# Patient Record
Sex: FEMALE | NOT HISPANIC OR LATINO | Employment: OTHER | URBAN - METROPOLITAN AREA
[De-identification: names, ages, dates, MRNs, and addresses within clinical notes are randomized per-mention and may not be internally consistent; named-entity substitution may affect disease eponyms.]

---

## 2020-02-28 ENCOUNTER — OFFICE VISIT (OUTPATIENT)
Dept: CARDIOLOGY | Facility: CLINIC | Age: 25
End: 2020-02-28

## 2020-02-28 VITALS
BODY MASS INDEX: 40 KG/M2 | HEART RATE: 121 BPM | OXYGEN SATURATION: 94 % | SYSTOLIC BLOOD PRESSURE: 150 MMHG | HEIGHT: 57 IN | WEIGHT: 185.4 LBS | DIASTOLIC BLOOD PRESSURE: 100 MMHG

## 2020-02-28 DIAGNOSIS — I10 ESSENTIAL HYPERTENSION: Primary | ICD-10-CM

## 2020-02-28 PROCEDURE — 99244 OFF/OP CNSLTJ NEW/EST MOD 40: CPT | Performed by: INTERNAL MEDICINE

## 2020-02-28 RX ORDER — FLUTICASONE PROPIONATE 50 MCG
1 SPRAY, SUSPENSION (ML) NASAL DAILY
COMMUNITY
Start: 2016-04-14

## 2020-02-28 RX ORDER — LISINOPRIL AND HYDROCHLOROTHIAZIDE 12.5; 1 MG/1; MG/1
1 TABLET ORAL DAILY
Qty: 30 TABLET | Refills: 11 | Status: SHIPPED | OUTPATIENT
Start: 2020-02-28 | End: 2020-08-14 | Stop reason: RX

## 2020-02-28 RX ORDER — FLUTICASONE PROPIONATE 220 UG/1
AEROSOL, METERED RESPIRATORY (INHALATION)
COMMUNITY
Start: 2020-02-03

## 2020-02-28 RX ORDER — ACETAMINOPHEN 160 MG/5ML
SOLUTION ORAL
COMMUNITY

## 2020-02-28 RX ORDER — LEVETIRACETAM 750 MG/1
TABLET ORAL
COMMUNITY
Start: 2020-02-23

## 2020-02-28 RX ORDER — FLUTICASONE FUROATE 100 UG/1
POWDER RESPIRATORY (INHALATION)
COMMUNITY
Start: 2020-02-04 | End: 2022-03-28 | Stop reason: ALTCHOICE

## 2020-02-28 RX ORDER — ACETAMINOPHEN 160 MG/5ML
LIQUID ORAL
COMMUNITY
Start: 2019-12-14 | End: 2022-01-24 | Stop reason: ALTCHOICE

## 2020-02-28 RX ORDER — DIPHENOXYLATE HYDROCHLORIDE AND ATROPINE SULFATE 2.5; .025 MG/1; MG/1
TABLET ORAL DAILY
COMMUNITY
Start: 2020-01-25

## 2020-02-28 RX ORDER — ALBUTEROL SULFATE 2.5 MG/3ML
2.5 SOLUTION RESPIRATORY (INHALATION)
COMMUNITY

## 2020-03-05 PROBLEM — I10 ESSENTIAL HYPERTENSION: Status: ACTIVE | Noted: 2020-03-05

## 2020-03-05 NOTE — PROGRESS NOTES
Subjective:     Encounter Date:02/28/2020      Patient ID: Shari Connelly is a 24 y.o. female.    Dear Jess thank you for referring this patient for consultation of hypertension.          Chief Complaint:  Hypertension   This is a recurrent problem. The current episode started more than 1 month ago. The problem has been waxing and waning since onset. The problem is uncontrolled.       24-year-old female who presents today for evaluation.  Patient says back in September having some issues.  In November she developed pneumonia.  1/5/2020 she had a seizure.  She said it started in her hand and then went over her entire body.  She was seen and evaluated at that time and her blood pressure was noted to be elevated.  On 1/12/2020 she had a second seizure on 2/12/2020 she had a third seizure and on 2/23/2020 when she had a fourth seizure.  A lot of times appears to be associated with her blood pressure.  Patient has been evaluated by a neurologist and was seen in my office today for further treatment of her blood pressure.  Review of Systems   Respiratory: Positive for cough and wheezing.    Skin: Positive for itching.   All other systems reviewed and are negative.      Procedures       Objective:     Physical Exam   Constitutional: She is oriented to person, place, and time. She appears well-developed.   HENT:   Head: Normocephalic.   Eyes: Conjunctivae are normal.   Neck: Normal range of motion.   Cardiovascular: Normal rate, regular rhythm and normal heart sounds.   Pulmonary/Chest: Breath sounds normal.   Abdominal: Soft. Bowel sounds are normal.   Musculoskeletal: Normal range of motion. She exhibits no edema.   Neurological: She is alert and oriented to person, place, and time.   Skin: Skin is warm and dry.   Psychiatric: She has a normal mood and affect. Her behavior is normal.   Vitals reviewed.      Lab Review:       Assessment:          Diagnosis Plan   1. Essential hypertension  Basic Metabolic Panel     Duplex Renal Artery - Bilateral Complete CAR          Plan:       1.  Hypertension the setting of a young female I am going to set her up for a renal ultrasound.  Patient was started on Zestoretic.  She has not had any blood work since she started on it some also setting her up for a BMP.  Will await the studies follow her blood pressure and see what evolves.  Reevaluate in 4 weeks.  2.  Seizure-like activity.  I also encouraged her to follow-up with neurology.

## 2020-03-23 ENCOUNTER — TELEPHONE (OUTPATIENT)
Dept: CARDIOLOGY | Facility: CLINIC | Age: 25
End: 2020-03-23

## 2020-03-23 NOTE — TELEPHONE ENCOUNTER
03/23/20  12:26 PM    Patient called for COVID rescreening.    Patient would  Like to keep appointment. Patient is negative for COVID screening. .      RIAZ Bishop  [facility]

## 2020-03-24 ENCOUNTER — OFFICE VISIT (OUTPATIENT)
Dept: CARDIOLOGY | Facility: CLINIC | Age: 25
End: 2020-03-24

## 2020-03-24 VITALS
WEIGHT: 184 LBS | DIASTOLIC BLOOD PRESSURE: 80 MMHG | BODY MASS INDEX: 39.7 KG/M2 | HEIGHT: 57 IN | OXYGEN SATURATION: 100 % | HEART RATE: 89 BPM | SYSTOLIC BLOOD PRESSURE: 120 MMHG

## 2020-03-24 DIAGNOSIS — I10 ESSENTIAL HYPERTENSION: Primary | ICD-10-CM

## 2020-03-24 PROCEDURE — 99213 OFFICE O/P EST LOW 20 MIN: CPT | Performed by: INTERNAL MEDICINE

## 2020-03-24 RX ORDER — AMOXICILLIN 400 MG/5ML
POWDER, FOR SUSPENSION ORAL
COMMUNITY
Start: 2020-03-12 | End: 2020-12-02 | Stop reason: ALTCHOICE

## 2020-03-24 RX ORDER — BENZONATATE 100 MG/1
CAPSULE ORAL
COMMUNITY
Start: 2020-03-07 | End: 2020-12-02 | Stop reason: ALTCHOICE

## 2020-03-24 RX ORDER — AMOXICILLIN 875 MG/1
TABLET, COATED ORAL
COMMUNITY
Start: 2020-03-12 | End: 2020-12-02 | Stop reason: ALTCHOICE

## 2020-03-24 NOTE — PROGRESS NOTES
Subjective:     Encounter Date:03/24/2020      Patient ID: Shari Connelly is a 24 y.o. female.    Chief Complaint:  Hypertension   This is a chronic problem. The problem is controlled.     24-year-old female who presents today for reevaluation.  Patient had a blood pressure that was out-of-control.  We changed her medicines around and she presents today for reevaluation.  She did not get her echo or renal ultrasound because of the current outbreak.  She did keep her appointment today and says overall she is doing significantly better.  Her blood pressure today is 120/80 these are the numbers she has been getting at home.  Her only complaint is a headache which is been going on for a long time she has seen neurology for.  Some of that may be secondary to her blood pressure dropping she denies any types of visual changes chest pain shortness of breath dizziness lightheadedness.    Review of Systems   All other systems reviewed and are negative.      Procedures       Objective:     Physical Exam   Constitutional: She is oriented to person, place, and time. She appears well-developed.   HENT:   Head: Normocephalic.   Eyes: Conjunctivae are normal.   Neck: Normal range of motion.   Cardiovascular: Normal rate, regular rhythm and normal heart sounds.   Pulmonary/Chest: Breath sounds normal.   Abdominal: Soft. Bowel sounds are normal.   Musculoskeletal: Normal range of motion. She exhibits no edema.   Neurological: She is alert and oriented to person, place, and time.   Skin: Skin is warm and dry.   Psychiatric: She has a normal mood and affect. Her behavior is normal.   Vitals reviewed.      Lab Review:       Assessment:          Diagnosis Plan   1. Essential hypertension            Plan:       1.  Hypertension blood pressure significantly better clinically doing well.  She has a headache which has been chronic in nature she is seen neurology for this.  I told her to get her echo and renal ultrasound about 3 months from  now due to the current viral outbreak.  I told her follow-up in 6 months sooner if she has issues.

## 2020-08-14 ENCOUNTER — TELEPHONE (OUTPATIENT)
Dept: CARDIOLOGY | Facility: CLINIC | Age: 25
End: 2020-08-14

## 2020-08-14 RX ORDER — LISINOPRIL 10 MG/1
10 TABLET ORAL DAILY
Qty: 30 TABLET | Refills: 6 | Status: SHIPPED | OUTPATIENT
Start: 2020-08-14 | End: 2021-02-23

## 2020-08-14 RX ORDER — HYDROCHLOROTHIAZIDE 12.5 MG/1
12.5 CAPSULE, GELATIN COATED ORAL DAILY
COMMUNITY
End: 2020-08-14 | Stop reason: SDUPTHER

## 2020-08-14 RX ORDER — HYDROCHLOROTHIAZIDE 12.5 MG/1
12.5 CAPSULE, GELATIN COATED ORAL DAILY
Qty: 30 CAPSULE | Refills: 6 | Status: SHIPPED | OUTPATIENT
Start: 2020-08-14 | End: 2021-02-23

## 2020-08-14 RX ORDER — LISINOPRIL 10 MG/1
10 TABLET ORAL DAILY
COMMUNITY
End: 2020-08-14 | Stop reason: SDUPTHER

## 2020-08-14 NOTE — TELEPHONE ENCOUNTER
Pt's mother left [facility] and is upset because the  of her daughter's Lisinopril-hctz changes every time she picks it up-- the pill is a different color so it's upsetting her b/c she feels like the medicine is being changed,etc.  She said Shari's BP was doing great.    I called & spoke with the pharmacist to see what's going on -- he said there's a global backorder of this medicine since last year.  He said you could order this as 2 separate prescriptions for a constant  <---He said it's been constant for @ 6 months.    Thanks,  Elin

## 2020-10-07 ENCOUNTER — TELEPHONE (OUTPATIENT)
Dept: CARDIOLOGY | Facility: CLINIC | Age: 25
End: 2020-10-07

## 2020-10-07 NOTE — TELEPHONE ENCOUNTER
Pt called regarding echo and labs that she needs to get done and also needs a follow up appointment.     Attempted to contact patient with no answer. Left message to call back.     Scheduling please attempt to schedule for Echo and follow up appointment per visit on 3/24/2020.

## 2020-10-08 DIAGNOSIS — I10 ESSENTIAL HYPERTENSION: Primary | ICD-10-CM

## 2020-10-08 NOTE — TELEPHONE ENCOUNTER
Patient was scheduled for her next follow up at her last appointment.    I do not see an order for an echo, but I do see one for a renal ultrasound.  Please advise on what testing I am supposed to schedule.    Thank you  Casandra MAYS

## 2020-10-08 NOTE — TELEPHONE ENCOUNTER
LOV you stated pt needs Echo & renal US.  Please place order for Echo.  Also, pt's f/u appt is in December-do you want to see her sooner?    Thanks,  Elin

## 2020-10-13 ENCOUNTER — TELEPHONE (OUTPATIENT)
Dept: CARDIOLOGY | Facility: CLINIC | Age: 25
End: 2020-10-13

## 2020-10-13 DIAGNOSIS — I10 ESSENTIAL HYPERTENSION: Primary | ICD-10-CM

## 2020-10-28 ENCOUNTER — LAB (OUTPATIENT)
Dept: LAB | Facility: HOSPITAL | Age: 25
End: 2020-10-28

## 2020-10-28 ENCOUNTER — HOSPITAL ENCOUNTER (OUTPATIENT)
Dept: CARDIOLOGY | Facility: HOSPITAL | Age: 25
Discharge: HOME | End: 2020-10-28

## 2020-10-28 VITALS
BODY MASS INDEX: 39.7 KG/M2 | SYSTOLIC BLOOD PRESSURE: 120 MMHG | DIASTOLIC BLOOD PRESSURE: 74 MMHG | HEIGHT: 57 IN | WEIGHT: 184 LBS

## 2020-10-28 DIAGNOSIS — I10 ESSENTIAL HYPERTENSION: ICD-10-CM

## 2020-10-28 LAB
ANION GAP SERPL CALCULATED.3IONS-SCNC: 9.6 MMOL/L (ref 5–15)
BASOPHILS # BLD AUTO: 0.07 10*3/MM3 (ref 0–0.2)
BASOPHILS NFR BLD AUTO: 0.8 % (ref 0–1.5)
BH CV ECHO MEAS - DIST REN A EDV LEFT: 26.1 CM/SEC
BH CV ECHO MEAS - DIST REN A PSV LEFT: 190 CM/SEC
BH CV ECHO MEAS - DIST REN A RI LEFT: 0.86
BH CV ECHO MEAS - MID REN A EDV LEFT: -52.7 CM/SEC
BH CV ECHO MEAS - MID REN A PSV LEFT: -160.3 CM/SEC
BH CV ECHO MEAS - MID REN A RI LEFT: 0.67
BH CV ECHO MEAS - PROX REN A EDV LEFT: -28.6 CM/SEC
BH CV ECHO MEAS - PROX REN A PSV LEFT: -123 CM/SEC
BH CV ECHO MEAS - PROX REN A RI LEFT: 0.77
BH CV VAS KIDNEY HEIGHT LEFT: 4.5 CM
BH CV VAS RENAL AORTIC MID PSV: 161 CM/S
BH CV XLRA MEAS - KID L LEFT: 9.7 CM
BH CV XLRA MEAS DIST REN A EDV RIGHT: -26.5 CM/SEC
BH CV XLRA MEAS DIST REN A PSV RIGHT: -103.4 CM/SEC
BH CV XLRA MEAS DIST REN A RI RIGHT: 0.74
BH CV XLRA MEAS KID H RIGHT: 4.8 CM
BH CV XLRA MEAS KID L RIGHT: 11.4 CM
BH CV XLRA MEAS MID REN A EDV RIGHT: -39.5 CM/SEC
BH CV XLRA MEAS MID REN A PSV RIGHT: -145 CM/SEC
BH CV XLRA MEAS MID REN A RI RIGHT: 0.73
BH CV XLRA MEAS PROX REN A EDV RIGHT: 42.8 CM/SEC
BH CV XLRA MEAS PROX REN A PSV RIGHT: 163.6 CM/SEC
BH CV XLRA MEAS PROX REN A RI RIGHT: 0.74
BH CV XLRA MEAS RAR LEFT: 0.8
BH CV XLRA MEAS RAR RIGHT: 0.9
BUN SERPL-MCNC: 6 MG/DL (ref 6–20)
BUN/CREAT SERPL: 12.5 (ref 7–25)
CALCIUM SPEC-SCNC: 9.4 MG/DL (ref 8.6–10.5)
CHLORIDE SERPL-SCNC: 101 MMOL/L (ref 98–107)
CO2 SERPL-SCNC: 25.4 MMOL/L (ref 22–29)
CREAT SERPL-MCNC: 0.48 MG/DL (ref 0.57–1)
DEPRECATED RDW RBC AUTO: 38.2 FL (ref 37–54)
EOSINOPHIL # BLD AUTO: 0.19 10*3/MM3 (ref 0–0.4)
EOSINOPHIL NFR BLD AUTO: 2 % (ref 0.3–6.2)
ERYTHROCYTE [DISTWIDTH] IN BLOOD BY AUTOMATED COUNT: 13 % (ref 12.3–15.4)
GFR SERPL CREATININE-BSD FRML MDRD: >150 ML/MIN/1.73
GFR SERPL CREATININE-BSD FRML MDRD: >150 ML/MIN/1.73
GLUCOSE SERPL-MCNC: 89 MG/DL (ref 65–99)
HCT VFR BLD AUTO: 41.9 % (ref 34–46.6)
HGB BLD-MCNC: 14.4 G/DL (ref 12–15.9)
IMM GRANULOCYTES # BLD AUTO: 0.04 10*3/MM3 (ref 0–0.05)
IMM GRANULOCYTES NFR BLD AUTO: 0.4 % (ref 0–0.5)
LYMPHOCYTES # BLD AUTO: 1.82 10*3/MM3 (ref 0.7–3.1)
LYMPHOCYTES NFR BLD AUTO: 19.5 % (ref 19.6–45.3)
MCH RBC QN AUTO: 28 PG (ref 26.6–33)
MCHC RBC AUTO-ENTMCNC: 34.4 G/DL (ref 31.5–35.7)
MCV RBC AUTO: 81.4 FL (ref 79–97)
MONOCYTES # BLD AUTO: 0.64 10*3/MM3 (ref 0.1–0.9)
MONOCYTES NFR BLD AUTO: 6.9 % (ref 5–12)
NEUTROPHILS NFR BLD AUTO: 6.56 10*3/MM3 (ref 1.7–7)
NEUTROPHILS NFR BLD AUTO: 70.4 % (ref 42.7–76)
NRBC BLD AUTO-RTO: 0 /100 WBC (ref 0–0.2)
PLATELET # BLD AUTO: 398 10*3/MM3 (ref 140–450)
PMV BLD AUTO: 9.7 FL (ref 6–12)
POTASSIUM SERPL-SCNC: 3.8 MMOL/L (ref 3.5–5.2)
RBC # BLD AUTO: 5.15 10*6/MM3 (ref 3.77–5.28)
SODIUM SERPL-SCNC: 136 MMOL/L (ref 136–145)
TSH SERPL DL<=0.05 MIU/L-ACNC: 1.48 UIU/ML (ref 0.27–4.2)
WBC # BLD AUTO: 9.32 10*3/MM3 (ref 3.4–10.8)

## 2020-10-28 PROCEDURE — 93975 VASCULAR STUDY: CPT

## 2020-10-28 PROCEDURE — 93306 TTE W/DOPPLER COMPLETE: CPT | Performed by: INTERNAL MEDICINE

## 2020-10-28 PROCEDURE — 36415 COLL VENOUS BLD VENIPUNCTURE: CPT

## 2020-10-28 PROCEDURE — 93306 TTE W/DOPPLER COMPLETE: CPT

## 2020-10-28 PROCEDURE — 84443 ASSAY THYROID STIM HORMONE: CPT

## 2020-10-28 PROCEDURE — 80048 BASIC METABOLIC PNL TOTAL CA: CPT

## 2020-10-28 PROCEDURE — 93975 VASCULAR STUDY: CPT | Performed by: INTERNAL MEDICINE

## 2020-10-28 PROCEDURE — 85025 COMPLETE CBC W/AUTO DIFF WBC: CPT

## 2020-10-29 ENCOUNTER — TELEPHONE (OUTPATIENT)
Dept: CARDIOLOGY | Facility: CLINIC | Age: 25
End: 2020-10-29

## 2020-10-29 LAB
BH CV ECHO MEAS - ACS: 1.6 CM
BH CV ECHO MEAS - AO MAX PG (FULL): 7.1 MMHG
BH CV ECHO MEAS - AO MAX PG: 12.3 MMHG
BH CV ECHO MEAS - AO MEAN PG (FULL): 4.3 MMHG
BH CV ECHO MEAS - AO MEAN PG: 7.6 MMHG
BH CV ECHO MEAS - AO ROOT AREA (BSA CORRECTED): 1.3
BH CV ECHO MEAS - AO ROOT AREA: 4.2 CM^2
BH CV ECHO MEAS - AO ROOT DIAM: 2.3 CM
BH CV ECHO MEAS - AO V2 MAX: 175.1 CM/SEC
BH CV ECHO MEAS - AO V2 MEAN: 132.3 CM/SEC
BH CV ECHO MEAS - AO V2 VTI: 37 CM
BH CV ECHO MEAS - AVA(I,A): 1.2 CM^2
BH CV ECHO MEAS - AVA(I,D): 1.2 CM^2
BH CV ECHO MEAS - AVA(V,A): 1.3 CM^2
BH CV ECHO MEAS - AVA(V,D): 1.3 CM^2
BH CV ECHO MEAS - BSA(HAYCOCK): 1.9 M^2
BH CV ECHO MEAS - BSA: 1.7 M^2
BH CV ECHO MEAS - BZI_BMI: 39.8 KILOGRAMS/M^2
BH CV ECHO MEAS - BZI_METRIC_HEIGHT: 144.8 CM
BH CV ECHO MEAS - BZI_METRIC_WEIGHT: 83.5 KG
BH CV ECHO MEAS - EDV(MOD-SP2): 83 ML
BH CV ECHO MEAS - EDV(MOD-SP4): 102 ML
BH CV ECHO MEAS - EDV(TEICH): 79.6 ML
BH CV ECHO MEAS - EF(CUBED): 69.2 %
BH CV ECHO MEAS - EF(MOD-BP): 62.3 %
BH CV ECHO MEAS - EF(MOD-SP2): 60.2 %
BH CV ECHO MEAS - EF(MOD-SP4): 62.7 %
BH CV ECHO MEAS - EF(TEICH): 61.1 %
BH CV ECHO MEAS - ESV(MOD-SP2): 33 ML
BH CV ECHO MEAS - ESV(MOD-SP4): 38 ML
BH CV ECHO MEAS - ESV(TEICH): 30.9 ML
BH CV ECHO MEAS - FS: 32.5 %
BH CV ECHO MEAS - IVS/LVPW: 0.92
BH CV ECHO MEAS - IVSD: 0.73 CM
BH CV ECHO MEAS - LAT PEAK E' VEL: 12.2 CM/SEC
BH CV ECHO MEAS - LV DIASTOLIC VOL/BSA (35-75): 58.8 ML/M^2
BH CV ECHO MEAS - LV MASS(C)D: 95.9 GRAMS
BH CV ECHO MEAS - LV MASS(C)DI: 55.3 GRAMS/M^2
BH CV ECHO MEAS - LV MAX PG: 5.2 MMHG
BH CV ECHO MEAS - LV MEAN PG: 3.4 MMHG
BH CV ECHO MEAS - LV SYSTOLIC VOL/BSA (12-30): 21.9 ML/M^2
BH CV ECHO MEAS - LV V1 MAX: 113.8 CM/SEC
BH CV ECHO MEAS - LV V1 MEAN: 88.2 CM/SEC
BH CV ECHO MEAS - LV V1 VTI: 23 CM
BH CV ECHO MEAS - LVIDD: 4.2 CM
BH CV ECHO MEAS - LVIDS: 2.9 CM
BH CV ECHO MEAS - LVLD AP2: 7.1 CM
BH CV ECHO MEAS - LVLD AP4: 7.6 CM
BH CV ECHO MEAS - LVLS AP2: 6.1 CM
BH CV ECHO MEAS - LVLS AP4: 5.9 CM
BH CV ECHO MEAS - LVOT AREA (M): 2 CM^2
BH CV ECHO MEAS - LVOT AREA: 2 CM^2
BH CV ECHO MEAS - LVOT DIAM: 1.6 CM
BH CV ECHO MEAS - LVPWD: 0.8 CM
BH CV ECHO MEAS - MED PEAK E' VEL: 9.9 CM/SEC
BH CV ECHO MEAS - MV A DUR: 0.08 SEC
BH CV ECHO MEAS - MV A MAX VEL: 75.5 CM/SEC
BH CV ECHO MEAS - MV DEC SLOPE: 500.1 CM/SEC^2
BH CV ECHO MEAS - MV DEC TIME: 0.12 SEC
BH CV ECHO MEAS - MV E MAX VEL: 103 CM/SEC
BH CV ECHO MEAS - MV E/A: 1.4
BH CV ECHO MEAS - MV MAX PG: 5.6 MMHG
BH CV ECHO MEAS - MV MEAN PG: 3.1 MMHG
BH CV ECHO MEAS - MV P1/2T MAX VEL: 103.2 CM/SEC
BH CV ECHO MEAS - MV P1/2T: 60.4 MSEC
BH CV ECHO MEAS - MV V2 MAX: 118.1 CM/SEC
BH CV ECHO MEAS - MV V2 MEAN: 84.2 CM/SEC
BH CV ECHO MEAS - MV V2 VTI: 25.1 CM
BH CV ECHO MEAS - MVA P1/2T LCG: 2.1 CM^2
BH CV ECHO MEAS - MVA(P1/2T): 3.6 CM^2
BH CV ECHO MEAS - MVA(VTI): 1.8 CM^2
BH CV ECHO MEAS - PA MAX PG (FULL): 5.4 MMHG
BH CV ECHO MEAS - PA MAX PG: 7.2 MMHG
BH CV ECHO MEAS - PA V2 MAX: 134.4 CM/SEC
BH CV ECHO MEAS - PULM A REVS DUR: 0.08 SEC
BH CV ECHO MEAS - PULM A REVS VEL: 28.9 CM/SEC
BH CV ECHO MEAS - PULM DIAS VEL: 42 CM/SEC
BH CV ECHO MEAS - PULM S/D: 1.4
BH CV ECHO MEAS - PULM SYS VEL: 57.7 CM/SEC
BH CV ECHO MEAS - PVA(V,A): 1.4 CM^2
BH CV ECHO MEAS - PVA(V,D): 1.4 CM^2
BH CV ECHO MEAS - QP/QS: 1.2
BH CV ECHO MEAS - RAP SYSTOLE: 3 MMHG
BH CV ECHO MEAS - RV MAX PG: 1.8 MMHG
BH CV ECHO MEAS - RV MEAN PG: 1.3 MMHG
BH CV ECHO MEAS - RV V1 MAX: 67.3 CM/SEC
BH CV ECHO MEAS - RV V1 MEAN: 54.9 CM/SEC
BH CV ECHO MEAS - RV V1 VTI: 19 CM
BH CV ECHO MEAS - RVOT AREA: 2.8 CM^2
BH CV ECHO MEAS - RVOT DIAM: 1.9 CM
BH CV ECHO MEAS - SI(AO): 89.3 ML/M^2
BH CV ECHO MEAS - SI(CUBED): 30 ML/M^2
BH CV ECHO MEAS - SI(LVOT): 26.1 ML/M^2
BH CV ECHO MEAS - SI(MOD-SP2): 28.8 ML/M^2
BH CV ECHO MEAS - SI(MOD-SP4): 36.9 ML/M^2
BH CV ECHO MEAS - SI(TEICH): 28 ML/M^2
BH CV ECHO MEAS - SV(AO): 155.1 ML
BH CV ECHO MEAS - SV(CUBED): 52.1 ML
BH CV ECHO MEAS - SV(LVOT): 45.2 ML
BH CV ECHO MEAS - SV(MOD-SP2): 50 ML
BH CV ECHO MEAS - SV(MOD-SP4): 64 ML
BH CV ECHO MEAS - SV(RVOT): 52.9 ML
BH CV ECHO MEAS - SV(TEICH): 48.6 ML
BH CV ECHO MEAS - TAPSE (>1.6): 2.4 CM
BH CV ECHO MEASUREMENTS AVERAGE E/E' RATIO: 9.32
BH CV XLRA - RV BASE: 3.2 CM
BH CV XLRA - RV LENGTH: 6.4 CM
BH CV XLRA - RV MID: 2.2 CM
BH CV XLRA - TDI S': 14.1 CM/SEC
LEFT ATRIUM VOLUME INDEX: 20 ML/M2
LV EF 2D ECHO EST: 62 %
MAXIMAL PREDICTED HEART RATE: 194 BPM
STRESS TARGET HR: 165 BPM

## 2020-11-05 ENCOUNTER — TELEPHONE (OUTPATIENT)
Dept: CARDIOLOGY | Facility: CLINIC | Age: 25
End: 2020-11-05

## 2020-12-02 ENCOUNTER — OFFICE VISIT (OUTPATIENT)
Dept: CARDIOLOGY | Facility: CLINIC | Age: 25
End: 2020-12-02

## 2020-12-02 VITALS
DIASTOLIC BLOOD PRESSURE: 70 MMHG | WEIGHT: 166 LBS | OXYGEN SATURATION: 98 % | BODY MASS INDEX: 35.81 KG/M2 | HEART RATE: 80 BPM | HEIGHT: 57 IN | SYSTOLIC BLOOD PRESSURE: 118 MMHG

## 2020-12-02 DIAGNOSIS — I10 ESSENTIAL HYPERTENSION: Primary | ICD-10-CM

## 2020-12-02 PROCEDURE — 99213 OFFICE O/P EST LOW 20 MIN: CPT | Performed by: INTERNAL MEDICINE

## 2020-12-02 NOTE — PROGRESS NOTES
Subjective:     Encounter Date:03/24/2020      Patient ID: Shari Connelly is a 25 y.o. female.    Chief Complaint:  Hypertension  This is a chronic problem. The problem is controlled.     24-year-old female who presents today for reevaluation.  Patient had a blood pressure that was out-of-control.  We changed her medicines around and she presents today for reevaluation.  She did not get her echo or renal ultrasound because of the current outbreak.  She did keep her appointment today and says overall she is doing significantly better.  Her blood pressure today is 120/80 these are the numbers she has been getting at home.  Her only complaint is a headache which is been going on for a long time she has seen neurology for.  Some of that may be secondary to her blood pressure dropping she denies any types of visual changes chest pain shortness of breath dizziness lightheadedness.    Review of Systems   All other systems reviewed and are negative.      Procedures         Objective:     Physical Exam   Constitutional: She is oriented to person, place, and time. She appears well-developed.   HENT:   Head: Normocephalic.   Eyes: Conjunctivae are normal.   Neck: Normal range of motion.   Cardiovascular: Normal rate, regular rhythm and normal heart sounds.   Pulmonary/Chest: Breath sounds normal.   Abdominal: Soft. Bowel sounds are normal.   Musculoskeletal: Normal range of motion.         General: No edema.   Neurological: She is alert and oriented to person, place, and time.   Skin: Skin is warm and dry.   Psychiatric: She has a normal mood and affect. Her behavior is normal.   Vitals reviewed.      Lab Review:       Assessment:          Diagnosis Plan   1. Essential hypertension            Plan:       1.  Hypertension blood pressures great.  Would continue the same follow-up in 1 year.  Clinically patient is doing well she is lost 25 pounds and continues to do well.

## 2021-02-23 RX ORDER — HYDROCHLOROTHIAZIDE 12.5 MG/1
12.5 CAPSULE, GELATIN COATED ORAL DAILY
Qty: 30 CAPSULE | Refills: 6 | Status: SHIPPED | OUTPATIENT
Start: 2021-02-23 | End: 2021-12-08

## 2021-02-23 RX ORDER — LISINOPRIL 10 MG/1
10 TABLET ORAL DAILY
Qty: 30 TABLET | Refills: 6 | Status: SHIPPED | OUTPATIENT
Start: 2021-02-23 | End: 2022-03-09

## 2021-08-23 RX ORDER — LISINOPRIL AND HYDROCHLOROTHIAZIDE 12.5; 1 MG/1; MG/1
1 TABLET ORAL DAILY
Qty: 30 TABLET | Refills: 11 | OUTPATIENT
Start: 2021-08-23

## 2021-12-08 RX ORDER — HYDROCHLOROTHIAZIDE 12.5 MG/1
12.5 CAPSULE, GELATIN COATED ORAL DAILY
Qty: 30 CAPSULE | Refills: 1 | Status: SHIPPED | OUTPATIENT
Start: 2021-12-08 | End: 2022-02-09

## 2022-01-24 ENCOUNTER — OFFICE VISIT (OUTPATIENT)
Dept: CARDIOLOGY | Facility: CLINIC | Age: 27
End: 2022-01-24

## 2022-01-24 VITALS
BODY MASS INDEX: 35.81 KG/M2 | WEIGHT: 166 LBS | SYSTOLIC BLOOD PRESSURE: 115 MMHG | HEART RATE: 68 BPM | DIASTOLIC BLOOD PRESSURE: 74 MMHG | HEIGHT: 57 IN

## 2022-01-24 DIAGNOSIS — I10 ESSENTIAL HYPERTENSION: Primary | ICD-10-CM

## 2022-01-24 PROCEDURE — 99213 OFFICE O/P EST LOW 20 MIN: CPT | Performed by: INTERNAL MEDICINE

## 2022-01-24 PROCEDURE — 93000 ELECTROCARDIOGRAM COMPLETE: CPT | Performed by: INTERNAL MEDICINE

## 2022-01-26 NOTE — PROGRESS NOTES
"      CARDIOLOGY    Karl Steele MD    ENCOUNTER DATE:  01/24/2022    Shari Connelly / 26 y.o. / female        CHIEF COMPLAINT / REASON FOR OFFICE VISIT     Essential hypertension (12/02/2020 Follow up)      HISTORY OF PRESENT ILLNESS       HPI  Shari Connelly is a 26 y.o. female who presents today for reevaluation.  Patient overall is actually doing relatively well.  She does not have any chest pain shortness of breath palpitations lightheadedness swelling or fatigue.  Patient is having a little bit of episodes where she just does not feel good.  Patient had COVID-19 back in October 2021.  Patient's blood pressure is actually on the low side today at 115/74.  I am concerned that she may be having some symptoms with her blood pressure being low.  I refilled her lisinopril but I am going to stop her hydrochlorothiazide.  Her mother is very involved in her care due to her disability.  She is going to follow her blood pressure and see her back in 8 weeks.      The following portions of the patient's history were reviewed and updated as appropriate: allergies, current medications, past family history, past medical history, past social history, past surgical history and problem list.      VITAL SIGNS     Visit Vitals  /74 (BP Location: Left arm)   Pulse 68   Ht 144.8 cm (57\")   Wt 75.3 kg (166 lb)   BMI 35.92 kg/m²         Wt Readings from Last 3 Encounters:   01/24/22 75.3 kg (166 lb)   12/02/20 75.3 kg (166 lb)   10/28/20 83.5 kg (184 lb)     Body mass index is 35.92 kg/m².      REVIEW OF SYSTEMS   Review of Systems   All other systems reviewed and are negative.          PHYSICAL EXAMINATION     Vitals reviewed.   Constitutional:       Appearance: Healthy appearance.   Pulmonary:      Effort: Pulmonary effort is normal.   Cardiovascular:      PMI at left midclavicular line.   Neurological:      Mental Status: Alert and oriented to person, place and time.           REVIEWED DATA       ECG 12 Lead    Date/Time: " 1/24/2022 10:58 AM  Performed by: Karl Steele MD  Authorized by: Karl Steele MD   Comparison: compared with previous ECG from 2/12/2020  Similar to previous ECG  Rhythm: sinus rhythm    Clinical impression: normal ECG            Cardiac Procedures:  1.           ASSESSMENT & PLAN      Diagnosis Plan   1. Essential hypertension           SUMMARY/DISCUSSION  1. Hypertension blood pressure significantly better if anything it may be on the low side.  I am going to stop her hydrochlorothiazide see how she does.  Patient is to follow-up in 8 weeks for reassessment.        MEDICATIONS         Discharge Medications          Accurate as of January 24, 2022 11:59 PM. If you have any questions, ask your nurse or doctor.            Continue These Medications      Instructions Start Date   acetaminophen 160 MG/5ML solution  Commonly known as: TYLENOL   Oral      albuterol (2.5 MG/3ML) 0.083% nebulizer solution  Commonly known as: PROVENTIL   2.5 mg, Inhalation      Arnuity Ellipta 100 MCG/ACT aerosol powder   Generic drug: Fluticasone Furoate   INL 1 PUFF PO QD      Flovent  MCG/ACT inhaler  Generic drug: fluticasone   INHALE 2 PUFFS PO QHS      fluticasone 50 MCG/ACT nasal spray  Commonly known as: FLONASE   1 spray, Nasal, Daily      hydroCHLOROthiazide 12.5 MG capsule  Commonly known as: MICROZIDE   12.5 mg, Oral, Daily      IRON PO   Oral      levETIRAcetam 750 MG tablet  Commonly known as: KEPPRA   TAKE 1 TABLET PO BID FOR 30 DAYS      lisinopril 10 MG tablet  Commonly known as: PRINIVIL,ZESTRIL   10 mg, Oral, Daily      loratadine-pseudoephedrine 5-120 MG per 12 hr tablet  Commonly known as: CLARITIN-D 12-hour   Oral      multivitamin tablet tablet   Oral, Daily                 **Dragon Disclaimer:   Much of this encounter note is an electronic transcription/translation of spoken language to printed text. The electronic translation of spoken language may permit erroneous, or at times, nonsensical  words or phrases to be inadvertently transcribed. Although I have reviewed the note for such errors, some may still exist.

## 2022-02-09 RX ORDER — HYDROCHLOROTHIAZIDE 12.5 MG/1
12.5 CAPSULE, GELATIN COATED ORAL DAILY
Qty: 30 CAPSULE | Refills: 1 | Status: SHIPPED | OUTPATIENT
Start: 2022-02-09 | End: 2022-05-06

## 2022-03-09 RX ORDER — LISINOPRIL 10 MG/1
10 TABLET ORAL DAILY
Qty: 30 TABLET | Refills: 6 | Status: SHIPPED | OUTPATIENT
Start: 2022-03-09

## 2022-03-28 ENCOUNTER — OFFICE VISIT (OUTPATIENT)
Dept: CARDIOLOGY | Facility: CLINIC | Age: 27
End: 2022-03-28

## 2022-03-28 VITALS
DIASTOLIC BLOOD PRESSURE: 86 MMHG | HEART RATE: 90 BPM | HEIGHT: 57 IN | OXYGEN SATURATION: 98 % | SYSTOLIC BLOOD PRESSURE: 115 MMHG | BODY MASS INDEX: 36.85 KG/M2 | WEIGHT: 170.8 LBS

## 2022-03-28 DIAGNOSIS — I10 ESSENTIAL HYPERTENSION: Primary | ICD-10-CM

## 2022-03-28 PROCEDURE — 99213 OFFICE O/P EST LOW 20 MIN: CPT | Performed by: INTERNAL MEDICINE

## 2022-03-28 NOTE — PROGRESS NOTES
"      CARDIOLOGY    Karl Steele MD    ENCOUNTER DATE:  03/28/2022    Shari Connelly / 26 y.o. / female        CHIEF COMPLAINT / REASON FOR OFFICE VISIT     Essential hypertension (01/24/2022 Follow up)      HISTORY OF PRESENT ILLNESS       HPI  Shari Connelly is a 26 y.o. female who presents today for reevaluation.  Clinically patient is doing great no complaints.  Her blood pressures remained very stable and she says she feels good.      The following portions of the patient's history were reviewed and updated as appropriate: allergies, current medications, past family history, past medical history, past social history, past surgical history and problem list.      VITAL SIGNS     Visit Vitals  /86 (BP Location: Left arm)   Pulse 90   Ht 144.8 cm (57\")   Wt 77.5 kg (170 lb 12.8 oz)   SpO2 98%   BMI 36.96 kg/m²         Wt Readings from Last 3 Encounters:   03/28/22 77.5 kg (170 lb 12.8 oz)   01/24/22 75.3 kg (166 lb)   12/02/20 75.3 kg (166 lb)     Body mass index is 36.96 kg/m².      REVIEW OF SYSTEMS   ROS        PHYSICAL EXAMINATION     Vitals reviewed.   Constitutional:       Appearance: Healthy appearance.   Pulmonary:      Effort: Pulmonary effort is normal.   Cardiovascular:      Normal rate. Regular rhythm. Normal S1. Normal S2.      Murmurs: There is no murmur.      No gallop. No click. No rub.   Pulses:     Intact distal pulses.   Edema:     Peripheral edema absent.   Neurological:      Mental Status: Alert and oriented to person, place and time.           REVIEWED DATA     Procedures    Cardiac Procedures:  1.           ASSESSMENT & PLAN      Diagnosis Plan   1. Essential hypertension           SUMMARY/DISCUSSION  1. Hypertension blood pressure is great we will continue the same.  Would follow-up in 1 to 2 years sooner course if she has issues.        MEDICATIONS         Discharge Medications          Accurate as of March 28, 2022  3:04 PM. If you have any questions, ask your nurse or doctor.       "      Continue These Medications      Instructions Start Date   acetaminophen 160 MG/5ML solution  Commonly known as: TYLENOL   Oral      albuterol (2.5 MG/3ML) 0.083% nebulizer solution  Commonly known as: PROVENTIL   2.5 mg, Inhalation      Flovent  MCG/ACT inhaler  Generic drug: fluticasone   INHALE 2 PUFFS PO QHS      fluticasone 50 MCG/ACT nasal spray  Commonly known as: FLONASE   1 spray, Nasal, Daily      hydroCHLOROthiazide 12.5 MG capsule  Commonly known as: MICROZIDE   12.5 mg, Oral, Daily      IRON PO   Oral      levETIRAcetam 750 MG tablet  Commonly known as: KEPPRA   TAKE 1 TABLET PO BID FOR 30 DAYS      lisinopril 10 MG tablet  Commonly known as: PRINIVIL,ZESTRIL   10 mg, Oral, Daily      loratadine-pseudoephedrine 5-120 MG per 12 hr tablet  Commonly known as: CLARITIN-D 12-hour   Oral      multivitamin tablet tablet   Oral, Daily                 **Dragon Disclaimer:   Much of this encounter note is an electronic transcription/translation of spoken language to printed text. The electronic translation of spoken language may permit erroneous, or at times, nonsensical words or phrases to be inadvertently transcribed. Although I have reviewed the note for such errors, some may still exist.

## 2022-05-06 RX ORDER — HYDROCHLOROTHIAZIDE 12.5 MG/1
12.5 CAPSULE, GELATIN COATED ORAL DAILY
Qty: 30 CAPSULE | Refills: 1 | Status: SHIPPED | OUTPATIENT
Start: 2022-05-06

## 2023-04-10 ENCOUNTER — OFFICE VISIT (OUTPATIENT)
Dept: CARDIOLOGY | Facility: CLINIC | Age: 28
End: 2023-04-10
Payer: COMMERCIAL

## 2023-04-10 VITALS
DIASTOLIC BLOOD PRESSURE: 82 MMHG | HEART RATE: 60 BPM | BODY MASS INDEX: 37.24 KG/M2 | HEIGHT: 57 IN | WEIGHT: 172.6 LBS | SYSTOLIC BLOOD PRESSURE: 130 MMHG

## 2023-04-10 DIAGNOSIS — I10 ESSENTIAL HYPERTENSION: Primary | ICD-10-CM

## 2023-04-10 PROCEDURE — 3079F DIAST BP 80-89 MM HG: CPT | Performed by: INTERNAL MEDICINE

## 2023-04-10 PROCEDURE — 3075F SYST BP GE 130 - 139MM HG: CPT | Performed by: INTERNAL MEDICINE

## 2023-04-10 PROCEDURE — 93000 ELECTROCARDIOGRAM COMPLETE: CPT | Performed by: INTERNAL MEDICINE

## 2023-04-10 PROCEDURE — 99213 OFFICE O/P EST LOW 20 MIN: CPT | Performed by: INTERNAL MEDICINE

## 2023-04-10 RX ORDER — ERGOCALCIFEROL 1.25 MG/1
1 CAPSULE ORAL WEEKLY
COMMUNITY
Start: 2023-01-20

## 2023-04-10 RX ORDER — LISINOPRIL 10 MG/1
10 TABLET ORAL DAILY
Qty: 90 TABLET | Refills: 3 | Status: SHIPPED | OUTPATIENT
Start: 2023-04-10

## 2023-04-10 NOTE — PROGRESS NOTES
"      CARDIOLOGY    Karl Steele MD    ENCOUNTER DATE:  04/10/2023    Shari Connelly / 27 y.o. / female        CHIEF COMPLAINT / REASON FOR OFFICE VISIT     Essential hypertension (03/28/2022 )      HISTORY OF PRESENT ILLNESS       HPI  Shari Connelly is a 27 y.o. female who presents today for reevaluation.  Overall she is doing fine occasionally she will get a headache but not any types of chest pain shortness of breath palpitations.  Blood pressure has been running good.      The following portions of the patient's history were reviewed and updated as appropriate: allergies, current medications, past family history, past medical history, past social history, past surgical history and problem list.      VITAL SIGNS     Visit Vitals  /82 (BP Location: Left arm)   Pulse 60   Ht 144.8 cm (57\")   Wt 78.3 kg (172 lb 9.6 oz)   BMI 37.35 kg/m²         Wt Readings from Last 3 Encounters:   04/10/23 78.3 kg (172 lb 9.6 oz)   03/28/22 77.5 kg (170 lb 12.8 oz)   01/24/22 75.3 kg (166 lb)     Body mass index is 37.35 kg/m².      REVIEW OF SYSTEMS   ROS        PHYSICAL EXAMINATION     Vitals reviewed.   Constitutional:       Appearance: Healthy appearance.   Pulmonary:      Effort: Pulmonary effort is normal.   Cardiovascular:      Normal rate. Regular rhythm. Normal S1. Normal S2.      Murmurs: There is no murmur.      No gallop. No click. No rub.   Pulses:     Intact distal pulses.   Edema:     Peripheral edema absent.   Neurological:      Mental Status: Alert.           REVIEWED DATA       ECG 12 Lead    Date/Time: 4/10/2023 2:56 PM  Performed by: Karl Steele MD  Authorized by: Karl Steele MD   Comparison: compared with previous ECG from 1/24/2022  Similar to previous ECG  Rhythm: sinus rhythm    Clinical impression: normal ECG            Cardiac Procedures:  1.           ASSESSMENT & PLAN      Diagnosis Plan   1. Essential hypertension              SUMMARY/DISCUSSION  1. Hypertension blood pressures " good we will continue the same.  I refilled her medications patient was told to follow-up in 1 year sooner if issues.  Patient's ECG is also unremarkable.        MEDICATIONS         Discharge Medications          Accurate as of April 10, 2023  2:56 PM. If you have any questions, ask your nurse or doctor.            Continue These Medications      Instructions Start Date   acetaminophen 160 MG/5ML solution  Commonly known as: TYLENOL   Oral      albuterol (2.5 MG/3ML) 0.083% nebulizer solution  Commonly known as: PROVENTIL   2.5 mg, Inhalation      Flovent  MCG/ACT inhaler  Generic drug: fluticasone   INHALE 2 PUFFS PO QHS      fluticasone 50 MCG/ACT nasal spray  Commonly known as: FLONASE   1 spray, Nasal, Daily      hydroCHLOROthiazide 12.5 MG capsule  Commonly known as: MICROZIDE   12.5 mg, Oral, Daily      IRON PO   Oral      levETIRAcetam 750 MG tablet  Commonly known as: KEPPRA   TAKE 1 TABLET PO BID FOR 30 DAYS      lisinopril 10 MG tablet  Commonly known as: PRINIVIL,ZESTRIL   10 mg, Oral, Daily      loratadine-pseudoephedrine 5-120 MG per 12 hr tablet  Commonly known as: CLARITIN-D 12-hour   Oral      multivitamin tablet tablet   Oral, Daily      vitamin D 1.25 MG (98499 UT) capsule capsule  Commonly known as: ERGOCALCIFEROL   1 capsule, Oral, Weekly                 **Dragon Disclaimer:   Much of this encounter note is an electronic transcription/translation of spoken language to printed text. The electronic translation of spoken language may permit erroneous, or at times, nonsensical words or phrases to be inadvertently transcribed. Although I have reviewed the note for such errors, some may still exist.

## 2024-02-28 ENCOUNTER — TELEPHONE (OUTPATIENT)
Dept: CARDIOLOGY | Facility: CLINIC | Age: 29
End: 2024-02-28
Payer: COMMERCIAL

## 2024-02-28 NOTE — TELEPHONE ENCOUNTER
Caller: Shari Connelly    Relationship: Self    Best call back number: [phone]    What is the best time to reach you: BEFORE 2PM IF POSSIBLE    Who are you requesting to speak with (clinical staff, provider,  specific staff member): LATOYA     What was the call regarding: PT IS REQUESTING TO SPEAK WITH LATOYA IN REGARDS TO A DISABILITY LETTER SHE NEEDS MADE, THEY ARE TRYING TO GET FOOD STAMPS. PLEASE CALL BACK TO DISCUSS ASAP.    Is it okay if the provider responds through MyChart: CALL BACK

## 2024-02-29 NOTE — TELEPHONE ENCOUNTER
Left msg letting pt's mother know per Dr. Steele we do not write disability letters for food stamps./prt

## 2024-04-23 RX ORDER — LISINOPRIL 10 MG/1
10 TABLET ORAL DAILY
Qty: 90 TABLET | Refills: 3 | Status: SHIPPED | OUTPATIENT
Start: 2024-04-23

## 2024-05-01 ENCOUNTER — OFFICE VISIT (OUTPATIENT)
Dept: CARDIOLOGY | Facility: CLINIC | Age: 29
End: 2024-05-01
Payer: COMMERCIAL

## 2024-05-01 VITALS
WEIGHT: 176 LBS | HEART RATE: 73 BPM | DIASTOLIC BLOOD PRESSURE: 84 MMHG | HEIGHT: 57 IN | BODY MASS INDEX: 37.97 KG/M2 | SYSTOLIC BLOOD PRESSURE: 138 MMHG

## 2024-05-01 DIAGNOSIS — I10 ESSENTIAL HYPERTENSION: Primary | ICD-10-CM

## 2024-05-03 NOTE — PROGRESS NOTES
"      CARDIOLOGY    Karl Steele MD    ENCOUNTER DATE:  05/01/2024    Shari Connelly / 28 y.o. / female        CHIEF COMPLAINT / REASON FOR OFFICE VISIT     Essential hypertension (04/10/2023 Follow up)      HISTORY OF PRESENT ILLNESS       HPI  Shari Connelly is a 28 y.o. female who presents today for evaluation.  Clinically she is doing well.  Her blood pressure has been staying very nice and stable.      The following portions of the patient's history were reviewed and updated as appropriate: allergies, current medications, past family history, past medical history, past social history, past surgical history and problem list.      VITAL SIGNS     Visit Vitals  /84 (BP Location: Left arm)   Pulse 73   Ht 144.8 cm (57\")   Wt 79.8 kg (176 lb)   BMI 38.09 kg/m²         Wt Readings from Last 3 Encounters:   05/01/24 79.8 kg (176 lb)   04/10/23 78.3 kg (172 lb 9.6 oz)   03/28/22 77.5 kg (170 lb 12.8 oz)     Body mass index is 38.09 kg/m².      REVIEW OF SYSTEMS   ROS        PHYSICAL EXAMINATION     Vitals reviewed.   Constitutional:       Appearance: Healthy appearance.   Cardiovascular:      Normal rate. Regular rhythm. Normal S1. Normal S2.       Murmurs: There is a grade 2/6 systolic murmur.      No gallop.  No click. No rub.   Pulses:     Intact distal pulses.   Edema:     Peripheral edema absent.   Neurological:      Mental Status: Alert.           REVIEWED DATA       ECG 12 Lead    Date/Time: 5/1/2024 3:26 PM  Performed by: Karl Steele MD    Authorized by: Karl Steele MD  Comparison: compared with previous ECG from 4/10/2023  Similar to previous ECG  Rhythm: sinus rhythm    Clinical impression: non-specific ECG          Cardiac Procedures:            ASSESSMENT & PLAN      Diagnosis Plan   1. Essential hypertension              SUMMARY/DISCUSSION  Hypertension.  Blood pressure has been well-controlled.  Patient with 2 out of 6 systolic murmur.  Last echo was in 2020.  Will probably repeat " it when she follows up on her next visit.  Remains asymptomatic.  Follow-up 1 year or sooner if issues.        MEDICATIONS         Discharge Medications            Accurate as of May 1, 2024 11:59 PM. If you have any questions, ask your nurse or doctor.                Continue These Medications        Instructions Start Date   acetaminophen 160 MG/5ML solution  Commonly known as: TYLENOL   Oral      albuterol (2.5 MG/3ML) 0.083% nebulizer solution  Commonly known as: PROVENTIL   2.5 mg, Inhalation      Flovent  MCG/ACT inhaler  Generic drug: fluticasone   INHALE 2 PUFFS PO QHS      fluticasone 50 MCG/ACT nasal spray  Commonly known as: FLONASE   1 spray, Nasal, Daily      hydroCHLOROthiazide 12.5 MG capsule  Commonly known as: MICROZIDE   12.5 mg, Oral, Daily      IRON PO   Oral      levETIRAcetam 750 MG tablet  Commonly known as: KEPPRA   TAKE 1 TABLET PO BID FOR 30 DAYS      lisinopril 10 MG tablet  Commonly known as: PRINIVIL,ZESTRIL   10 mg, Oral, Daily      loratadine-pseudoephedrine 5-120 MG per 12 hr tablet  Commonly known as: CLARITIN-D 12-hour   Oral      multivitamin tablet tablet   Oral, Daily      vitamin D 1.25 MG (56801 UT) capsule capsule  Commonly known as: ERGOCALCIFEROL   1 capsule, Oral, Weekly                   **Dragon Disclaimer:   Much of this encounter note is an electronic transcription/translation of spoken language to printed text. The electronic translation of spoken language may permit erroneous, or at times, nonsensical words or phrases to be inadvertently transcribed. Although I have reviewed the note for such errors, some may still exist.

## 2025-05-13 NOTE — PROGRESS NOTES
RM:________     PCP: Provider, No Known                                     Last EKG :  2024    : 1995  AGE: 29 y.o.    REASON FOR VISIT: __________________________________________    ____________________________________________________________                                                       Wt Readings from Last 3 Encounters:   24 79.8 kg (176 lb)   04/10/23 78.3 kg (172 lb 9.6 oz)   22 77.5 kg (170 lb 12.8 oz)      BP Readings from Last 3 Encounters:   24 138/84   04/10/23 130/82   22 115/86          WT: ____________ HT: ______ BP: __________ HR ______   02% _______                         LIPID PANEL  Order: [id number]  Component  Ref Range & Units 10 mo ago Resulting Agency   Triglycerides  0 - 149 mg/dL 82 [facility]   Comment: (note)  Triglyceride levels (in mg/dL):  Normal (0-9yrs: <75, 10-19yrs: <90, >19yrs: <150),  Borderline (0-9yrs: 75-99, 10-19yrs: , >19yrs: 150-199),  High/very high (0-9yrs: >99, 10-19yrs: >129, >19yrs: >200)   Total Cholesterol  0 - 199 mg/dL 154 [facility]   Comment: (note)  Cholesterol levels (in mg/dL):  Desirable <200 adults/<170 children (Desirable),  Borderline-high: 200-239 adults/170-199 children,  High >239 adults/>199 children.   HDL Cholesterol  60 - 9999 mg/dL 40 Low  [facility]   LDL Cholesterol  0 - 100 mg/dL 98 [facility]   Comment: (note)  LDL levels (in mg/dL):  Optimal <100,  Near optimal/above optimal 100-129,  Borderline High 130-159,  High 160-189, or  Very High >189   VLDL Cholesterol  0 - 30 mg/dL 16 [facility]   Chol/HDL Ratio  RATIO 3.9 [facility]   Is patient fasting?  (arb'U) Yes Dori MARADIAGA     Specimen Collected: 24 10:24    Performed by: DORI MARADIAGA

## 2025-05-14 ENCOUNTER — OFFICE VISIT (OUTPATIENT)
Dept: CARDIOLOGY | Age: 30
End: 2025-05-14
Payer: COMMERCIAL

## 2025-05-14 VITALS
WEIGHT: 183.4 LBS | DIASTOLIC BLOOD PRESSURE: 86 MMHG | OXYGEN SATURATION: 99 % | HEIGHT: 57 IN | BODY MASS INDEX: 39.57 KG/M2 | SYSTOLIC BLOOD PRESSURE: 118 MMHG | HEART RATE: 73 BPM

## 2025-05-14 DIAGNOSIS — R01.1 HEART MURMUR: ICD-10-CM

## 2025-05-14 DIAGNOSIS — I10 ESSENTIAL HYPERTENSION: Primary | ICD-10-CM

## 2025-05-14 PROCEDURE — 99214 OFFICE O/P EST MOD 30 MIN: CPT | Performed by: NURSE PRACTITIONER

## 2025-05-14 PROCEDURE — 93000 ELECTROCARDIOGRAM COMPLETE: CPT | Performed by: NURSE PRACTITIONER

## 2025-05-14 PROCEDURE — 3074F SYST BP LT 130 MM HG: CPT | Performed by: NURSE PRACTITIONER

## 2025-05-14 PROCEDURE — 3079F DIAST BP 80-89 MM HG: CPT | Performed by: NURSE PRACTITIONER

## 2025-05-14 RX ORDER — DEXTROMETHORPHAN POLISTIREX 30 MG/5ML
60 SUSPENSION ORAL AS NEEDED
COMMUNITY
Start: 2025-03-27

## 2025-05-14 RX ORDER — MONTELUKAST SODIUM 10 MG/1
10 TABLET ORAL DAILY
COMMUNITY
Start: 2025-03-06

## 2025-05-14 RX ORDER — AMOXICILLIN 875 MG/1
875 TABLET, COATED ORAL AS NEEDED
COMMUNITY
Start: 2025-03-27

## 2025-05-14 RX ORDER — PREDNISONE 20 MG/1
20 TABLET ORAL AS NEEDED
COMMUNITY
Start: 2025-03-06

## 2025-05-14 RX ORDER — AZELASTINE 1 MG/ML
2 SPRAY, METERED NASAL AS NEEDED
COMMUNITY
Start: 2025-03-06

## 2025-05-14 RX ORDER — LORATADINE 10 MG/1
10 TABLET ORAL DAILY
COMMUNITY
Start: 2025-03-06

## 2025-05-14 RX ORDER — SERTRALINE HYDROCHLORIDE 100 MG/1
100 TABLET, FILM COATED ORAL DAILY
COMMUNITY
Start: 2025-03-27

## 2025-05-14 RX ORDER — LISINOPRIL 10 MG/1
10 TABLET ORAL DAILY
Qty: 90 TABLET | Refills: 3 | Status: SHIPPED | OUTPATIENT
Start: 2025-05-14

## 2025-05-14 NOTE — PROGRESS NOTES
"    CARDIOLOGY        Patient Name: Shari Connelly  :1995  Age: 29 y.o.  Primary Cardiologist: Karl Steele MD  Encounter Provider:  RIAZ Read    Date of Service: 2025      CHIEF COMPLAINT / REASON FOR OFFICE VISIT     Follow-up (1 year) and Hypertension      HPI  Shari Connelly is a 29 y.o. female who presents today for hypertension.     Pt has a  history significant for HTN, seizures, anxiety, developmental disability..    Patient presents today with her mother.  Patient has done well since the last assessment. She is asymptomatic and denies any episodes of chest pain, shortness of breath, palpitations, lightheadedness, swelling or fatigue.      HISTORY OF PRESENT ILLNESS     Echocardiogram 10/28/2020.  LVEF 62%.  No significant valvular disease.    Patient was last evaluated in clinic 1 year ago.  At that time she was stable.  A murmur was noted on exam, recommended echocardiogram with next visit.      The following portions of the patient's history were reviewed and updated as appropriate: allergies, current medications, past family history, past medical history, past social history, past surgical history and problem list.      VITAL SIGNS     Visit Vitals  /86 (BP Location: Right arm, Patient Position: Sitting, Cuff Size: Adult)   Pulse 73   Ht 144.8 cm (57\")   Wt 83.2 kg (183 lb 6.4 oz)   SpO2 99%   BMI 39.69 kg/m²         Wt Readings from Last 3 Encounters:   25 83.2 kg (183 lb 6.4 oz)   24 79.8 kg (176 lb)   04/10/23 78.3 kg (172 lb 9.6 oz)     Body mass index is 39.69 kg/m².      REVIEW OF SYSTEMS     Review of Systems   Unable to perform ROS: Other           PHYSICAL EXAMINATION     Vitals and nursing note reviewed.   Constitutional:       Appearance: Normal appearance. Well-developed.   Eyes:      Conjunctiva/sclera: Conjunctivae normal.   Neck:      Vascular: No carotid bruit.   Pulmonary:      Breath sounds: Normal breath sounds.   Cardiovascular:      Normal " rate. Regular rhythm. Normal S1 with normal intensity. Normal S2 with normal intensity.       Murmurs: There is a systolic murmur.      No gallop.  No click. No rub.   Edema:     Peripheral edema absent.   Musculoskeletal: Normal range of motion. Skin:     General: Skin is warm and dry.   Neurological:      Mental Status: Alert and oriented to person, place, and time.      GCS: GCS eye subscore is 4. GCS verbal subscore is 5. GCS motor subscore is 6.   Psychiatric:         Speech: Speech normal.         Behavior: Behavior normal.         Thought Content: Thought content normal.         Judgment: Judgment normal.           REVIEWED DATA       ECG 12 Lead    Date/Time: 5/14/2025 2:23 PM  Performed by: Cinthya Saha APRN    Authorized by: Cinthya Saha APRN  Comparison: compared with previous ECG from 5/1/2024  Rhythm: sinus rhythm  Ectopy: unifocal PVCs  Rate: normal  BPM: 73  Conduction: conduction normal  ST Segments: ST segments normal  T Waves: T waves normal  QRS axis: normal    Clinical impression: normal ECG                  Lab Results   Component Value Date     05/25/2022     09/09/2021    K 4.0 05/25/2022    K 3.8 09/09/2021     05/25/2022     09/09/2021    CO2 23 05/25/2022    CO2 23 09/09/2021    BUN 6 (L) 05/25/2022    BUN 5 (L) 09/09/2021    CREATININE 0.50 (L) 05/25/2022    CREATININE 0.4 (L) 09/09/2021    EGFRIFNONA >150 10/28/2020    EGFRIFAFRI >60 05/18/2021    EGFRIFAFRI >150 10/28/2020    GLUCOSE 89 10/28/2020    CALCIUM 8.9 05/25/2022    CALCIUM 9.1 09/09/2021    ALBUMIN 4.2 05/25/2022    ALBUMIN 3.8 09/09/2021    BILITOT <0.2 (L) 05/25/2022    BILITOT 0.2 09/09/2021    AST 27 05/25/2022    AST 25 09/09/2021    ALT 24 05/25/2022    ALT 31 09/09/2021     Lab Results   Component Value Date    WBC 10.44 06/24/2024    WBC 7.15 06/19/2023    HGB 8.2 (L) 06/24/2024    HGB 11.9 (L) 06/19/2023    HCT 29.6 (L) 06/24/2024    HCT 38.1 06/19/2023    MCV 75.9 (L) 06/24/2024     MCV 81.2 06/19/2023     (H) 06/24/2024     06/19/2023     Lab Results   Component Value Date    BNP <11.1 03/06/2020     Lab Results   Component Value Date    CKTOTAL 157 02/13/2020    TROPONINI <0.012 03/06/2020     Lab Results   Component Value Date    TSH 0.869 05/25/2022    TSH 0.510 05/19/2021             ASSESSMENT & PLAN     Diagnoses and all orders for this visit:    1. Essential hypertension (Primary)  Overview:  Lisinopril 10 mg/day    Assessment & Plan:  Hypertension is stable and controlled  Continue current treatment regimen.  Dietary sodium restriction.  Regular aerobic exercise.  Ambulatory blood pressure monitoring.  Blood pressure will be reassessed in 1 year.    Orders:  -     lisinopril (PRINIVIL,ZESTRIL) 10 MG tablet; Take 1 tablet by mouth Daily.  Dispense: 90 tablet; Refill: 3    2. Heart murmur  Assessment & Plan:  Pt with history of congential heart murmur, last echo in 2020, due for updated echo.    Orders:  -     Adult Transthoracic Echo Complete W/ Cont if Necessary Per Protocol; Future        Return in about 1 year (around 5/14/2026) for Dr. Steele- Routine.    Future Appointments         Provider Department Center    5/22/2025 2:45 PM (Arrive by 2:15 PM) [facility] RM 1 [facility] OUTPATIENT ECHOCARDIOGRAPHY [facility]    5/18/2026 2:20 PM (Arrive by 2:05 PM) Karl Steele MD [facility] MEDICAL [facility] CARDIOLOGY [facility]              MEDICATIONS         Discharge Medications            Accurate as of May 14, 2025  2:59 PM. If you have any questions, ask your nurse or doctor.                Continue These Medications        Instructions Start Date   acetaminophen 160 MG/5ML solution  Commonly known as: TYLENOL   Take  by mouth.      albuterol (2.5 MG/3ML) 0.083% nebulizer solution  Commonly known as: PROVENTIL   2.5 mg      amoxicillin 875 MG tablet  Commonly known as: AMOXIL   875 mg, As Needed      azelastine 0.1 % nasal spray  Commonly known as:  ASTELIN   2 sprays, As Needed      dextromethorphan polistirex ER 30 MG/5ML Suspension Extended Release oral suspension  Commonly known as: DELSYM   60 mg, As Needed      Flovent  MCG/ACT inhaler  Generic drug: fluticasone   INHALE 2 PUFFS PO QHS      fluticasone 50 MCG/ACT nasal spray  Commonly known as: FLONASE   1 spray, Daily      IRON PO   Take  by mouth.      levETIRAcetam 750 MG tablet  Commonly known as: KEPPRA   TAKE 1 TABLET PO BID FOR 30 DAYS      lisinopril 10 MG tablet  Commonly known as: PRINIVIL,ZESTRIL   10 mg, Oral, Daily      loratadine 10 MG tablet  Commonly known as: CLARITIN   10 mg, Daily      montelukast 10 MG tablet  Commonly known as: SINGULAIR   10 mg, Daily      multivitamin tablet tablet   Daily      predniSONE 20 MG tablet  Commonly known as: DELTASONE   20 mg, As Needed      sertraline 100 MG tablet  Commonly known as: ZOLOFT   100 mg, Daily      vitamin D3 125 MCG (5000 UT) capsule capsule   5,000 Units, Daily                   **Dragon Disclaimer:   Much of this encounter note is an electronic transcription/translation of spoken language to printed text. The electronic translation of spoken language may permit erroneous, or at times, nonsensical words or phrases to be inadvertently transcribed. Although I have reviewed the note for such errors, some may still exist.

## 2025-05-21 ENCOUNTER — TELEPHONE (OUTPATIENT)
Dept: CARDIOLOGY | Age: 30
End: 2025-05-21
Payer: COMMERCIAL

## 2025-05-22 ENCOUNTER — HOSPITAL ENCOUNTER (OUTPATIENT)
Dept: CARDIOLOGY | Facility: HOSPITAL | Age: 30
Discharge: HOME | End: 2025-05-22
Admitting: NURSE PRACTITIONER
Payer: COMMERCIAL

## 2025-05-22 VITALS
WEIGHT: 183 LBS | DIASTOLIC BLOOD PRESSURE: 66 MMHG | OXYGEN SATURATION: 98 % | HEART RATE: 64 BPM | BODY MASS INDEX: 39.48 KG/M2 | HEIGHT: 57 IN | SYSTOLIC BLOOD PRESSURE: 130 MMHG

## 2025-05-22 DIAGNOSIS — R01.1 HEART MURMUR: ICD-10-CM

## 2025-05-22 PROCEDURE — 93306 TTE W/DOPPLER COMPLETE: CPT

## 2025-05-23 LAB
AORTIC ARCH: 2.3 CM
AORTIC DIMENSIONLESS INDEX: 0.65 (DI)
ASCENDING AORTA: 2 CM
AV MEAN PRESS GRAD SYS DOP V1V2: 5 MMHG
AV VMAX SYS DOP: 153 CM/SEC
BH CV ECHO MEAS - ACS: 1.78 CM
BH CV ECHO MEAS - AO MAX PG: 9.4 MMHG
BH CV ECHO MEAS - AO ROOT AREA (BSA CORRECTED): 1.6 CM2
BH CV ECHO MEAS - AO ROOT DIAM: 2.8 CM
BH CV ECHO MEAS - AO V2 VTI: 34.8 CM
BH CV ECHO MEAS - AVA(I,D): 1.75 CM2
BH CV ECHO MEAS - EDV(CUBED): 117.6 ML
BH CV ECHO MEAS - EDV(MOD-SP2): 100 ML
BH CV ECHO MEAS - EDV(MOD-SP4): 110 ML
BH CV ECHO MEAS - EF(MOD-SP2): 56 %
BH CV ECHO MEAS - EF(MOD-SP4): 67.3 %
BH CV ECHO MEAS - ESV(CUBED): 29.5 ML
BH CV ECHO MEAS - ESV(MOD-SP2): 44 ML
BH CV ECHO MEAS - ESV(MOD-SP4): 36 ML
BH CV ECHO MEAS - FS: 36.9 %
BH CV ECHO MEAS - IVS/LVPW: 0.88 CM
BH CV ECHO MEAS - IVSD: 0.7 CM
BH CV ECHO MEAS - LAT PEAK E' VEL: 12.4 CM/SEC
BH CV ECHO MEAS - LV DIASTOLIC VOL/BSA (35-75): 63.5 CM2
BH CV ECHO MEAS - LV MASS(C)D: 120.8 GRAMS
BH CV ECHO MEAS - LV MAX PG: 3.8 MMHG
BH CV ECHO MEAS - LV MEAN PG: 2 MMHG
BH CV ECHO MEAS - LV SYSTOLIC VOL/BSA (12-30): 20.8 CM2
BH CV ECHO MEAS - LV V1 MAX: 97.7 CM/SEC
BH CV ECHO MEAS - LV V1 VTI: 22.5 CM
BH CV ECHO MEAS - LVIDD: 4.9 CM
BH CV ECHO MEAS - LVIDS: 3.1 CM
BH CV ECHO MEAS - LVOT AREA: 2.7 CM2
BH CV ECHO MEAS - LVOT DIAM: 1.86 CM
BH CV ECHO MEAS - LVPWD: 0.8 CM
BH CV ECHO MEAS - MED PEAK E' VEL: 8.7 CM/SEC
BH CV ECHO MEAS - MV A DUR: 0.12 SEC
BH CV ECHO MEAS - MV A MAX VEL: 70.3 CM/SEC
BH CV ECHO MEAS - MV DEC SLOPE: 512.6 CM/SEC2
BH CV ECHO MEAS - MV DEC TIME: 0.14 SEC
BH CV ECHO MEAS - MV E MAX VEL: 107 CM/SEC
BH CV ECHO MEAS - MV E/A: 1.52
BH CV ECHO MEAS - MV MAX PG: 7 MMHG
BH CV ECHO MEAS - MV MEAN PG: 3.1 MMHG
BH CV ECHO MEAS - MV P1/2T: 75.1 MSEC
BH CV ECHO MEAS - MV V2 VTI: 42.6 CM
BH CV ECHO MEAS - MVA(P1/2T): 2.9 CM2
BH CV ECHO MEAS - MVA(VTI): 1.43 CM2
BH CV ECHO MEAS - PA ACC TIME: 0.11 SEC
BH CV ECHO MEAS - PA V2 MAX: 130.3 CM/SEC
BH CV ECHO MEAS - PULM A REVS DUR: 0.12 SEC
BH CV ECHO MEAS - PULM A REVS VEL: 41.6 CM/SEC
BH CV ECHO MEAS - PULM DIAS VEL: 39 CM/SEC
BH CV ECHO MEAS - PULM S/D: 1.01
BH CV ECHO MEAS - PULM SYS VEL: 39.4 CM/SEC
BH CV ECHO MEAS - QP/QS: 1.11
BH CV ECHO MEAS - RV MAX PG: 2.41 MMHG
BH CV ECHO MEAS - RV V1 MAX: 77.6 CM/SEC
BH CV ECHO MEAS - RV V1 VTI: 16.6 CM
BH CV ECHO MEAS - RVOT DIAM: 2.27 CM
BH CV ECHO MEAS - SV(LVOT): 60.9 ML
BH CV ECHO MEAS - SV(MOD-SP2): 56 ML
BH CV ECHO MEAS - SV(MOD-SP4): 74 ML
BH CV ECHO MEAS - SV(RVOT): 67.4 ML
BH CV ECHO MEAS - SVI(LVOT): 35.2 ML/M2
BH CV ECHO MEAS - SVI(MOD-SP2): 32.3 ML/M2
BH CV ECHO MEAS - SVI(MOD-SP4): 42.7 ML/M2
BH CV ECHO MEAS - TAPSE (>1.6): 2.8 CM
BH CV ECHO MEASUREMENTS AVERAGE E/E' RATIO: 10.14
BH CV XLRA - RV BASE: 3.5 CM
BH CV XLRA - RV LENGTH: 8 CM
BH CV XLRA - RV MID: 2.8 CM
BH CV XLRA - TDI S': 12.8 CM/SEC
LEFT ATRIUM VOLUME INDEX: 22.1 ML/M2
LV EF BIPLANE MOD: 61 %
SINUS: 2.07 CM
STJ: 1.96 CM